# Patient Record
Sex: FEMALE | Race: WHITE | NOT HISPANIC OR LATINO | Employment: FULL TIME | URBAN - METROPOLITAN AREA
[De-identification: names, ages, dates, MRNs, and addresses within clinical notes are randomized per-mention and may not be internally consistent; named-entity substitution may affect disease eponyms.]

---

## 2017-04-17 PROBLEM — L91.8 OTHER HYPERTROPHIC DISORDERS OF THE SKIN: Status: ACTIVE | Noted: 2017-04-17

## 2019-05-18 ENCOUNTER — HOSPITAL ENCOUNTER (OUTPATIENT)
Dept: LAB | Facility: MEDICAL CENTER | Age: 59
End: 2019-05-18
Attending: FAMILY MEDICINE
Payer: COMMERCIAL

## 2019-05-18 LAB
HCYS SERPL-SCNC: 10.28 UMOL/L
T4 FREE SERPL-MCNC: 0.58 NG/DL (ref 0.53–1.43)
TSH SERPL DL<=0.005 MIU/L-ACNC: 0.39 UIU/ML (ref 0.38–5.33)

## 2019-05-18 PROCEDURE — 80061 LIPID PANEL: CPT

## 2019-05-18 PROCEDURE — 84439 ASSAY OF FREE THYROXINE: CPT

## 2019-05-18 PROCEDURE — 83704 LIPOPROTEIN BLD QUAN PART: CPT

## 2019-05-18 PROCEDURE — 83090 ASSAY OF HOMOCYSTEINE: CPT

## 2019-05-18 PROCEDURE — 36415 COLL VENOUS BLD VENIPUNCTURE: CPT

## 2019-05-18 PROCEDURE — 84443 ASSAY THYROID STIM HORMONE: CPT

## 2019-05-21 LAB
CHOLEST SERPL-MCNC: 221 MG/DL
HDL PARTICAL NO Q4363: >41 UMOL/L
HDL SIZE Q4361: 8.1 NM
HDLC SERPL-MCNC: 48 MG/DL (ref 40–59)
HLD.LARGE SERPL-SCNC: <2.8 UMOL/L
L VLDL PART NO Q4357: 9.7 NMOL/L
LDL SERPL QN: 20.3 NM
LDL SERPL-SCNC: 2053 NMOL/L
LDL SMALL SERPL-SCNC: >1085 NMOL/L
LDLC SERPL CALC-MCNC: 136 MG/DL
PATHOLOGY STUDY: ABNORMAL
TRIGL SERPL-MCNC: 183 MG/DL (ref 30–149)
VLDL SIZE Q4362: 56.4 NM

## 2019-08-27 ENCOUNTER — OFFICE VISIT (OUTPATIENT)
Dept: BEHAVIORAL HEALTH | Facility: CLINIC | Age: 59
End: 2019-08-27
Payer: COMMERCIAL

## 2019-08-27 DIAGNOSIS — F43.21 ADJUSTMENT DISORDER WITH DEPRESSED MOOD: ICD-10-CM

## 2019-08-27 PROCEDURE — 90791 PSYCH DIAGNOSTIC EVALUATION: CPT | Performed by: MARRIAGE & FAMILY THERAPIST

## 2019-08-27 RX ORDER — THYROID 30 MG/1
30 TABLET ORAL DAILY
COMMUNITY

## 2019-08-27 SDOH — HEALTH STABILITY: MENTAL HEALTH: HOW OFTEN DO YOU HAVE A DRINK CONTAINING ALCOHOL?: 2-3 TIMES A WEEK
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
## 2019-10-04 ENCOUNTER — HOSPITAL ENCOUNTER (OUTPATIENT)
Dept: LAB | Facility: MEDICAL CENTER | Age: 59
End: 2019-10-04
Attending: NURSE PRACTITIONER
Payer: COMMERCIAL

## 2019-10-04 LAB
T4 FREE SERPL-MCNC: 0.56 NG/DL (ref 0.53–1.43)
TSH SERPL DL<=0.005 MIU/L-ACNC: 0.84 UIU/ML (ref 0.38–5.33)

## 2019-10-04 PROCEDURE — 84443 ASSAY THYROID STIM HORMONE: CPT

## 2019-10-04 PROCEDURE — 84439 ASSAY OF FREE THYROXINE: CPT

## 2019-10-04 PROCEDURE — 36415 COLL VENOUS BLD VENIPUNCTURE: CPT

## 2021-11-15 ENCOUNTER — APPOINTMENT (OUTPATIENT)
Dept: RADIOLOGY | Facility: MEDICAL CENTER | Age: 61
End: 2021-11-15
Attending: EMERGENCY MEDICINE
Payer: COMMERCIAL

## 2021-11-15 ENCOUNTER — HOSPITAL ENCOUNTER (EMERGENCY)
Facility: MEDICAL CENTER | Age: 61
End: 2021-11-15
Attending: EMERGENCY MEDICINE
Payer: COMMERCIAL

## 2021-11-15 VITALS
HEART RATE: 61 BPM | BODY MASS INDEX: 31.52 KG/M2 | WEIGHT: 171.3 LBS | SYSTOLIC BLOOD PRESSURE: 139 MMHG | TEMPERATURE: 98 F | OXYGEN SATURATION: 98 % | DIASTOLIC BLOOD PRESSURE: 86 MMHG | RESPIRATION RATE: 16 BRPM | HEIGHT: 62 IN

## 2021-11-15 DIAGNOSIS — M54.9 PAIN, UPPER BACK: Primary | ICD-10-CM

## 2021-11-15 PROCEDURE — 99284 EMERGENCY DEPT VISIT MOD MDM: CPT

## 2021-11-15 NOTE — ED TRIAGE NOTES
60 yo female ambulates to triage with reports of fell off 7 ft foot ladder on Friday and now has upper back pain also hit head denies loc.  Patient states she feels like something is lose.     Patient is Covid vaccinated

## 2021-11-15 NOTE — ED PROVIDER NOTES
ED Provider Note     11/15/2021  3:18 PM    Means of Arrival: Walk In  History obtained by: patient  Limitations: None  PCP: Patrizia Horne  CODE STATUS: Full    CHIEF COMPLAINT  Chief Complaint   Patient presents with   • T-5000 FALL     fell off 7 ft ladder    • Back Pain       HPI  Jyoti Acuña is a 61 y.o. female who presents with upper back pain after she fell off a 7 foot ladder 2 days ago.  This past Saturday night she was standing near the top of a ladder when she fell off, fell onto her right side.  She was able to get up and walk.  There was no loss of consciousness.  She has had pain in her right hip and her knees over the weekend.  However this pains have improved.  She has bruising in her right hip and knees.  Her main concern is tenderness at her mid back between her shoulder blades.  She was at the dentist earlier and when laying in a certain position it made the pain severe.  No tingling in extremities.  No weakness.  No difficulty with ambulation.  Sometimes the pain is worse when trying to take a deep breath.  She has had prior anterior rib fractures on the right side.    REVIEW OF SYSTEMS  Review of Systems   All other systems reviewed and are negative.    See HPI for further details.    PAST MEDICAL HISTORY   previously healthy. No anticoagulant use.     FAMILY HISTORY  History reviewed. No pertinent family history.    SOCIAL HISTORY  Social History     Tobacco Use   • Smoking status: Never Smoker   • Smokeless tobacco: Never Used   Vaping Use   • Vaping Use: Never used   Substance and Sexual Activity   • Alcohol use: Yes   • Drug use: Never   • Sexual activity: Not on file       SURGICAL HISTORY  patient denies any surgical history    CURRENT MEDICATIONS  Home Medications     Reviewed by Shikha Castro R.N. (Registered Nurse) on 11/15/21 at 1407  Med List Status: Not Addressed   Medication Last Dose Status   thyroid (ARMOUR THYROID) 30 MG Tab  Active                ALLERGIES  No Known  "Allergies    PHYSICAL EXAM  VITAL SIGNS: /86   Pulse 61   Temp 36.7 °C (98 °F) (Temporal)   Resp 16   Ht 1.575 m (5' 2\")   Wt 77.7 kg (171 lb 4.8 oz)   SpO2 98%   BMI 31.33 kg/m²     Pulse ox interpretation: I interpret this pulse ox as normal.  Constitutional: Alert in no apparent distress.  HENT: No signs of trauma, Bilateral external ears normal, No hemotympanum. Nose normal.   Eyes: Pupils are equal, Conjunctiva normal, Non-icteric.   Neck: Normal range of motion, No midline spine tenderness, Supple, No stridor.   Cardiovascular: Regular rate and rhythm, no murmurs. Symmetric distal pulses. No cyanosis of extremities. No peripheral edema of extremities.  Thorax & Lungs: Normal breath sounds, No respiratory distress, No wheezing, No chest tenderness.   Abdomen: Soft, No tenderness, No masses, No pulsatile masses. No peritoneal signs.  Skin: Warm, Dry, No erythema, No rash.   Back: Midline thoracic spine tenderness just below shoulder blades. Tenderness at right posterior thorax as well.    Musculoskeletal: Good range of motion in all major joints.  Ecchymosis at right hip with tenderness.   Neurologic: Alert , Normal motor function, Normal sensory function, No focal deficits noted.   Psychiatric: Affect normal, Judgment normal, Mood normal.   Physical Exam      DIAGNOSTIC STUDIES / PROCEDURES    LABS  Pertinent Labs & Imaging studies reviewed. (See chart for details)    RADIOLOGY  Pertinent Labs & Imaging studies reviewed. (See chart for details)    COURSE & MEDICAL DECISION MAKING  Pertinent Labs & Imaging studies reviewed. (See chart for details)    3:18 PM This is an emergent evaluation of a  61 y.o. female who presents with mid thoracic back pain. Physical exam significant for midline spine tenderness.  Fortunately no neurologic deficit. She also mentioned head trauma but there are no signs of trauma, no headache, no LOC, no neurologic deficit or signs of basilar skull fracture; no indication for " head CT. The differential diagnosis includes but is not limited to tspine fracture, back strain, posterior rib fracture. Ordered for CT thorax and tspine to evaluate. She declined any pain medication at this time.    4:39 PM  She no longer wants to wait for scans.  She says she is feeling better.  She has no neurologic deficits.  Vital signs are within normal limits.  There could be an underlying fracture but given her well appearance, no neurologic deficits, no respiratory abnormalities is unlikely that there would be intervention for an underlying fracture.  I believe she can safely leave at this time.  She agrees to return if she develops shortness of breath, weakness in her arms or legs, or worsening uncontrolled pain.        FINAL IMPRESSION    ICD-10-CM   1. Pain, upper back Active M54.9            This dictation was created using voice recognition software. The accuracy of the dictation is limited to the abilities of the software. I expect there may be some errors of grammar and possibly content. The nursing notes were reviewed and certain aspects of this information were incorporated into this note.    Electronically signed by: Duong Quinonez II, M.D., 11/15/2021 3:18 PM

## 2021-11-16 NOTE — ED NOTES
Pt provided with discharge paper work and follow up care. Pt declines questions. Pt ambulated out of ER without complication.

## 2021-11-16 NOTE — ED NOTES
Pt requesting to leave against medical advise as she feels that this is not an emergency and someone else should have her bed. ERP updated and discussed risk benefits. PT states that she is feeling better and would like to just like to go home.

## 2025-08-26 ENCOUNTER — APPOINTMENT (OUTPATIENT)
Dept: URBAN - METROPOLITAN AREA CLINIC 15 | Facility: CLINIC | Age: 65
Setting detail: DERMATOLOGY
End: 2025-08-26

## 2025-08-26 DIAGNOSIS — L57.0 ACTINIC KERATOSIS: ICD-10-CM

## 2025-08-26 DIAGNOSIS — Z80.8 FAMILY HISTORY OF MALIGNANT NEOPLASM OF OTHER ORGANS OR SYSTEMS: ICD-10-CM

## 2025-08-26 DIAGNOSIS — Z71.89 OTHER SPECIFIED COUNSELING: ICD-10-CM

## 2025-08-26 DIAGNOSIS — D18.0 HEMANGIOMA: ICD-10-CM

## 2025-08-26 DIAGNOSIS — L57.8 OTHER SKIN CHANGES DUE TO CHRONIC EXPOSURE TO NONIONIZING RADIATION: ICD-10-CM | Status: INADEQUATELY CONTROLLED

## 2025-08-26 DIAGNOSIS — D22 MELANOCYTIC NEVI: ICD-10-CM

## 2025-08-26 DIAGNOSIS — L82.1 OTHER SEBORRHEIC KERATOSIS: ICD-10-CM

## 2025-08-26 DIAGNOSIS — L81.4 OTHER MELANIN HYPERPIGMENTATION: ICD-10-CM

## 2025-08-26 PROBLEM — D22.9 MELANOCYTIC NEVI, UNSPECIFIED: Status: ACTIVE | Noted: 2025-08-26

## 2025-08-26 PROBLEM — D18.01 HEMANGIOMA OF SKIN AND SUBCUTANEOUS TISSUE: Status: ACTIVE | Noted: 2025-08-26

## 2025-08-26 PROCEDURE — ? COUNSELING

## 2025-08-26 PROCEDURE — ? LIQUID NITROGEN

## 2025-08-26 ASSESSMENT — LOCATION SIMPLE DESCRIPTION DERM: LOCATION SIMPLE: RIGHT CHEEK

## 2025-08-26 ASSESSMENT — LOCATION ZONE DERM: LOCATION ZONE: FACE

## 2025-08-26 ASSESSMENT — LOCATION DETAILED DESCRIPTION DERM: LOCATION DETAILED: RIGHT INFERIOR CENTRAL MALAR CHEEK
